# Patient Record
Sex: FEMALE | Race: WHITE | NOT HISPANIC OR LATINO | Employment: STUDENT | URBAN - METROPOLITAN AREA
[De-identification: names, ages, dates, MRNs, and addresses within clinical notes are randomized per-mention and may not be internally consistent; named-entity substitution may affect disease eponyms.]

---

## 2023-10-19 ENCOUNTER — OFFICE VISIT (OUTPATIENT)
Dept: URGENT CARE | Facility: CLINIC | Age: 18
End: 2023-10-19
Payer: COMMERCIAL

## 2023-10-19 VITALS
RESPIRATION RATE: 19 BRPM | HEART RATE: 87 BPM | HEIGHT: 60 IN | TEMPERATURE: 98 F | DIASTOLIC BLOOD PRESSURE: 69 MMHG | BODY MASS INDEX: 34.34 KG/M2 | OXYGEN SATURATION: 98 % | SYSTOLIC BLOOD PRESSURE: 108 MMHG | WEIGHT: 174.94 LBS

## 2023-10-19 DIAGNOSIS — R11.10 VOMITING, UNSPECIFIED VOMITING TYPE, UNSPECIFIED WHETHER NAUSEA PRESENT: Primary | ICD-10-CM

## 2023-10-19 DIAGNOSIS — K21.9 GASTROESOPHAGEAL REFLUX DISEASE, UNSPECIFIED WHETHER ESOPHAGITIS PRESENT: ICD-10-CM

## 2023-10-19 DIAGNOSIS — E10.9 TYPE 1 DIABETES MELLITUS WITHOUT COMPLICATION: ICD-10-CM

## 2023-10-19 LAB
BILIRUB UR QL STRIP: NEGATIVE
GLUCOSE UR QL STRIP: POSITIVE
KETONES UR QL STRIP: POSITIVE
LEUKOCYTE ESTERASE UR QL STRIP: NEGATIVE
PH, POC UA: 5
POC BLOOD, URINE: NEGATIVE
POC NITRATES, URINE: NEGATIVE
PROT UR QL STRIP: POSITIVE
SP GR UR STRIP: 1.01 (ref 1–1.03)
UROBILINOGEN UR STRIP-ACNC: NORMAL (ref 0.1–1.1)

## 2023-10-19 PROCEDURE — 81003 POCT URINALYSIS, DIPSTICK, AUTOMATED, W/O SCOPE: ICD-10-PCS | Mod: QW,S$GLB,, | Performed by: NURSE PRACTITIONER

## 2023-10-19 PROCEDURE — 99213 OFFICE O/P EST LOW 20 MIN: CPT | Mod: S$GLB,,, | Performed by: NURSE PRACTITIONER

## 2023-10-19 PROCEDURE — 99213 PR OFFICE/OUTPT VISIT, EST, LEVL III, 20-29 MIN: ICD-10-PCS | Mod: S$GLB,,, | Performed by: NURSE PRACTITIONER

## 2023-10-19 PROCEDURE — 81003 URINALYSIS AUTO W/O SCOPE: CPT | Mod: QW,S$GLB,, | Performed by: NURSE PRACTITIONER

## 2023-10-19 RX ORDER — SERTRALINE HYDROCHLORIDE 50 MG/1
50 TABLET, FILM COATED ORAL DAILY
COMMUNITY

## 2023-10-19 RX ORDER — PANTOPRAZOLE SODIUM 40 MG/1
40 TABLET, DELAYED RELEASE ORAL DAILY
Qty: 30 TABLET | Refills: 0 | Status: SHIPPED | OUTPATIENT
Start: 2023-10-19 | End: 2023-11-27 | Stop reason: ALTCHOICE

## 2023-10-19 RX ORDER — INSULIN LISPRO 100 [IU]/ML
INJECTION, SOLUTION INTRAVENOUS; SUBCUTANEOUS
COMMUNITY
Start: 2023-10-19

## 2023-10-19 RX ORDER — BUSPIRONE HYDROCHLORIDE 10 MG/1
15 TABLET ORAL 3 TIMES DAILY
COMMUNITY
Start: 2023-02-15

## 2023-10-19 RX ORDER — LEVOTHYROXINE SODIUM 125 UG/1
125 TABLET ORAL
COMMUNITY
Start: 2023-03-12 | End: 2024-03-11

## 2023-10-19 NOTE — PATIENT INSTRUCTIONS
Start Protonix daily.  Please increase hydration as much as possible.  Follow up with gastroenterology and endocrinology.  Most likely you will need an endoscopy.  Please avoid acidic foods, chewing gum, caffeine and carbonated  beverages.  Do not eat late at night.      Nausea & Vomiting  Increase fluids and rest is important     Start off with liquid diet and progress as tolerated (see below)  Water and clear liquids are important so you do not get dehydrated. Drink a small amount at a time.  Do not force yourself to eat, especially if you have cramps, vomiting, or diarrhea. When you finally decide to start eating, do not eat large amounts at a time, even if you are hungry.  If you eat, avoid fatty, greasy, spicy, or fried foods.  Do not eat dairy products if you have diarrhea; they can make the diarrhea worse    Watch for any increase pain, fever, localized pain to right lower abdomen or continued vomiting or diarrhea.     If your condition worsens or fails to improve we recommend that you receive another evaluation at the ER immediately or contact your PCP to discuss your concerns or return here.

## 2023-10-19 NOTE — PROGRESS NOTES
Subjective:      Patient ID: Bianca Huff is a 18 y.o. female.    Vitals:  height is 5' (1.524 m) and weight is 79.3 kg (174 lb 15 oz). Her oral temperature is 98.2 °F (36.8 °C). Her blood pressure is 108/69 and her pulse is 87. Her respiration is 19 and oxygen saturation is 98%.     Chief Complaint: Emesis    Patient presents vomiting nausea and fatigue that started 2 months. Patient started taking buspar about 6 months ago.    19 yo woman, with a h/o Type I DM, presents to clinic accompanied by her mother for intermittent nausea and vomiting x 2-3 months.  She reports the nausea and vomiting episodes come on suddenly, independent of eating.  She denies constipation, diarrhea or abdominal pain.  Reports her stools have been soft.  Denies any blood in stool or emesis.  Two days ago, she had 2 episodes of nausea and vomiting.  Reports worsening heartburn more recently.  Reports she's lost 15 lbs in the past 3-4 months. She was started on Buspar for anxiety 6 moths ago and noticed her appetite decreased drastically since starting the medication.  She was supposed to have an endocrinology appointment 2 days ago but she missed it because she was vomiting on that day.  He endocrinologist is at [facility] in [place].  She is in the process of switching because she is aging out of that system.  Reports she's had issues managing her blood sugars lately.  They have been fluctuating quite a bit.  Denies any fevers, chills, urinary frequency, urgency, dysuria or flank pain.  Pt is using Mirena IUD for birth control. Current .  Pt has a pump and a Dexcom system.     Emesis   This is a new problem. The current episode started more than 1 month ago. The problem occurs 2 to 4 times per day (2-3hrs apart). The problem has been unchanged. The emesis has an appearance of bile. There has been no fever. Pertinent negatives include no diarrhea. Risk factors include suspect food intake. She has tried nothing for the symptoms.      Gastrointestinal:  Positive for vomiting. Negative for diarrhea.      Objective:     Physical Exam   Constitutional: She is oriented to person, place, and time. She appears well-developed.   HENT:   Head: Normocephalic and atraumatic.   Ears:   Right Ear: External ear normal.   Left Ear: External ear normal.   Nose: Nose normal.   Mouth/Throat: Mucous membranes are normal. Mucous membranes are moist.      Comments: Clear pharynx; no adenopathy  Eyes: Conjunctivae and lids are normal.   Neck: Trachea normal. Neck supple.   Cardiovascular: Normal rate, regular rhythm and normal heart sounds.   Pulmonary/Chest: Effort normal and breath sounds normal. No respiratory distress.   Abdominal: Normal appearance and bowel sounds are normal. She exhibits no distension and no mass. Soft. There is no abdominal tenderness. There is no rebound, no guarding, no left CVA tenderness and no right CVA tenderness.   Musculoskeletal: Normal range of motion.         General: Normal range of motion.   Neurological: She is alert and oriented to person, place, and time. She has normal strength.   Skin: Skin is warm, dry, intact, not diaphoretic and not pale.   Psychiatric: Her speech is normal and behavior is normal. Judgment and thought content normal.   Nursing note and vitals reviewed.      Assessment:     1. Vomiting, unspecified vomiting type, unspecified whether nausea present    2. Gastroesophageal reflux disease, unspecified whether esophagitis present    3. Type 1 diabetes mellitus without complication      Results for orders placed or performed in visit on 10/19/23   POCT Urinalysis, Dipstick, Automated, W/O Scope   Result Value Ref Range    POC Blood, Urine Negative Negative    POC Bilirubin, Urine Negative Negative    POC Urobilinogen, Urine Normal 0.1 - 1.1    POC Ketones, Urine Positive (A) Negative    POC Protein, Urine Positive (A) Negative    POC Nitrates, Urine Negative Negative    POC Glucose, Urine Positive (A)  Negative    pH, UA 5.0     POC Specific Gravity, Urine 1.010 1.003 - 1.029    POC Leukocytes, Urine Negative Negative         Plan:   Patient's mother is here from  and will assist her with making new appointments and transferring care to the adult wing.  They understand the importance of following up with GI. Return to clinic as needed. Go to ER for worsening symptoms.  Patient Instructions   Start Protonix daily.  Please increase hydration as much as possible.  Follow up with gastroenterology and endocrinology.  Most likely you will need an endoscopy.  Please avoid acidic foods, chewing gum, caffeine and carbonated  beverages.  Do not eat late at night.      Nausea & Vomiting  Increase fluids and rest is important     Start off with liquid diet and progress as tolerated (see below)  Water and clear liquids are important so you do not get dehydrated. Drink a small amount at a time.  Do not force yourself to eat, especially if you have cramps, vomiting, or diarrhea. When you finally decide to start eating, do not eat large amounts at a time, even if you are hungry.  If you eat, avoid fatty, greasy, spicy, or fried foods.  Do not eat dairy products if you have diarrhea; they can make the diarrhea worse    Watch for any increase pain, fever, localized pain to right lower abdomen or continued vomiting or diarrhea.     If your condition worsens or fails to improve we recommend that you receive another evaluation at the ER immediately or contact your PCP to discuss your concerns or return here.       Vomiting, unspecified vomiting type, unspecified whether nausea present  -     POCT Urinalysis, Dipstick, Automated, W/O Scope  -     pantoprazole (PROTONIX) 40 MG tablet; Take 1 tablet (40 mg total) by mouth once daily.  Dispense: 30 tablet; Refill: 0  -     Ambulatory referral/consult to Gastroenterology  -     Ambulatory referral/consult to Endocrinology    Gastroesophageal reflux disease, unspecified whether  esophagitis present  -     pantoprazole (PROTONIX) 40 MG tablet; Take 1 tablet (40 mg total) by mouth once daily.  Dispense: 30 tablet; Refill: 0  -     Ambulatory referral/consult to Gastroenterology  -     Ambulatory referral/consult to Endocrinology    Type 1 diabetes mellitus without complication

## 2023-10-19 NOTE — LETTER
October 19, 2023      Urgent Care - [facility]  [address] [facility] [address]  Phone: [phone]  Fax: [phone]       Patient: Bianca Huff   YOB: 2005  Date of Visit: 10/19/2023    To Whom It May Concern:    Jevon Huff  was at [facility] on 10/19/2023. The patient may return to work/school on 10/20/23 with no restrictions. If you have any questions or concerns, or if I can be of further assistance, please do not hesitate to contact me.    Sincerely,    Ashley Barrett NP

## 2023-11-27 ENCOUNTER — OFFICE VISIT (OUTPATIENT)
Dept: URGENT CARE | Facility: CLINIC | Age: 18
End: 2023-11-27
Payer: OTHER GOVERNMENT

## 2023-11-27 VITALS
WEIGHT: 172.31 LBS | HEIGHT: 60 IN | RESPIRATION RATE: 19 BRPM | HEART RATE: 85 BPM | DIASTOLIC BLOOD PRESSURE: 78 MMHG | OXYGEN SATURATION: 99 % | BODY MASS INDEX: 33.83 KG/M2 | SYSTOLIC BLOOD PRESSURE: 133 MMHG | TEMPERATURE: 99 F

## 2023-11-27 DIAGNOSIS — H10.9 BACTERIAL CONJUNCTIVITIS OF BOTH EYES: Primary | ICD-10-CM

## 2023-11-27 DIAGNOSIS — H66.91 ACUTE OTITIS MEDIA, RIGHT: ICD-10-CM

## 2023-11-27 DIAGNOSIS — B96.89 BACTERIAL CONJUNCTIVITIS OF BOTH EYES: Primary | ICD-10-CM

## 2023-11-27 PROCEDURE — 99213 OFFICE O/P EST LOW 20 MIN: CPT | Mod: S$GLB,,, | Performed by: NURSE PRACTITIONER

## 2023-11-27 PROCEDURE — 99213 PR OFFICE/OUTPT VISIT, EST, LEVL III, 20-29 MIN: ICD-10-PCS | Mod: S$GLB,,, | Performed by: NURSE PRACTITIONER

## 2023-11-27 RX ORDER — ERYTHROMYCIN 5 MG/G
OINTMENT OPHTHALMIC 3 TIMES DAILY
Qty: 3.5 G | Refills: 0 | Status: SHIPPED | OUTPATIENT
Start: 2023-11-27 | End: 2023-12-07

## 2023-11-27 RX ORDER — AMOXICILLIN AND CLAVULANATE POTASSIUM 875; 125 MG/1; MG/1
1 TABLET, FILM COATED ORAL EVERY 12 HOURS
Qty: 20 TABLET | Refills: 0 | Status: SHIPPED | OUTPATIENT
Start: 2023-11-27 | End: 2023-12-07

## 2023-11-27 NOTE — PROGRESS NOTES
Subjective:      Patient ID: Bianca Huff is a 18 y.o. female.    Vitals:  height is 5' (1.524 m) and weight is 78.1 kg (172 lb 4.6 oz). Her oral temperature is 99.4 °F (37.4 °C). Her blood pressure is 133/78 and her pulse is 85. Her respiration is 19 and oxygen saturation is 99%.     Chief Complaint: Eye Problem    Patient presents redness and discharge in both eyes that started yesterday. Patient has been taking benadryl, oct eye drops and ibuprofen, its not helping.    Eye Problem   Both eyes are affected. This is a new problem. The current episode started yesterday. The problem occurs constantly. The problem has been unchanged. There was no injury mechanism. The pain is at a severity of 4/10. The pain is mild. There is No known exposure to pink eye. She Does not wear contacts. Associated symptoms include blurred vision, an eye discharge, eye redness and itching. She has tried eye drops for the symptoms. The treatment provided no relief.       Eyes:  Positive for eye discharge, eye itching, eye redness and blurred vision.    Objective:     Physical Exam   Constitutional: She is oriented to person, place, and time.  Non-toxic appearance. She does not appear ill. No distress.   HENT:   Head: Normocephalic and atraumatic.   Ears:   Right Ear: There is tenderness. Tympanic membrane is injected. A middle ear effusion is present.   Left Ear: Tympanic membrane normal.   Nose: No congestion.   Mouth/Throat: Mucous membranes are moist. Oropharynx is clear.   Eyes: Pupils are equal, round, and reactive to light. Right eye exhibits discharge and exudate. Left eye exhibits discharge and exudate. Right conjunctiva is injected. Left conjunctiva is injected. Extraocular movement intact      Comments: The upper and lower lids are both swollen and red.  The conjunctiva is extremely red as noted in the picture attached with a white halo indicating bacterial in nature.    Cardiovascular: Normal rate, regular rhythm, normal heart  sounds and normal pulses.   Pulmonary/Chest: Effort normal and breath sounds normal. No respiratory distress. She has no wheezes.   Abdominal: Normal appearance. There is no abdominal tenderness.   Musculoskeletal:      Right lower leg: No edema.      Left lower leg: No edema.   Lymphadenopathy:     She has cervical adenopathy.        Right cervical: Superficial cervical adenopathy present.        Left cervical: Superficial cervical adenopathy present.   Neurological: no focal deficit. She is alert and oriented to person, place, and time.   Skin: Skin is not diaphoretic.   Psychiatric: Her behavior is normal. Mood normal.   Nursing note and vitals reviewed.    Assessment:     1. Bacterial conjunctivitis of both eyes  - erythromycin (ROMYCIN) ophthalmic ointment; Place into both eyes 3 (three) times daily. for 10 days  Dispense: 3.5 g; Refill: 0    2. Acute otitis media, right  - amoxicillin-clavulanate 875-125mg (AUGMENTIN) 875-125 mg per tablet; Take 1 tablet by mouth every 12 (twelve) hours. for 10 days  Dispense: 20 tablet; Refill: 0       Plan:       Patient Instructions   Conjunctivitis (Pink Eye) ED   General Information   You came to the Emergency Department (ED) for pink eye. The medical name for this is conjunctivitis. Your eye is irritated or infected. Sometimes an allergy is bothering your eye. This means something in the air has come into contact with your eye and it is red and irritated. Your eye may also itch, burn, or be sensitive to light. If an infection is causing your pink eye, it is easy to spread from person to person.  Infections are often caused by viruses and antibiotics wont help. Most of the time, pink eye will go away on its own without treatment after a few days.  If the doctor thinks you have a bacterial infection in your eye, you will need antibiotics to treat the infection. It is important to take all of your antibiotics even if your eye starts to feel better.  What care is needed at  home?   Call your regular doctor to let them know you were in the ED. Make a follow-up appointment if you were told to.  Gently remove your eye drainage or crusting with a clean cloth and warm water.  Avoid pollen if an allergy is causing your pink eye. Stay inside as much as you can with the windows closed during peak allergy seasons.  Lubricating eye drops or allergy eye drops may help your eye feel better. Make sure to read the directions carefully. Wash your hands with care before and after you touch your eye.  When you use eye drops, take care not to touch the bottle or dropper to your eye.  If you wear contact lenses, you will need to stop wearing them for a short time until your symptoms go away. If your contacts are disposable, start with fresh ones after your eye gets better. If not, clean your contacts with care. Clean your contact case thoroughly or get a new one.  Avoid sharing towels, washcloths, bedding, or personal items when you have pink eye.  You may need to stay out of work or school for a few days.  When do I need to call the doctor?   You have trouble seeing clearly after blinking.  Your eye is still red or has drainage after 3 days.  You have eye pain that is getting worse.  You have new or worsening symptoms.  Last Reviewed Date   2020-12-16  Consumer Information Use and Disclaimer   This information is not specific medical advice and does not replace information you receive from your health care provider. This is only a brief summary of general information. It does NOT include all information about conditions, illnesses, injuries, tests, procedures, treatments, therapies, discharge instructions or life-style choices that may apply to you. You must talk with your health care provider for complete information about your health and treatment options. This information should not be used to decide whether or not to accept your health care providers advice, instructions or recommendations. Only  your health care provider has the knowledge and training to provide advice that is right for you.  Copyright   Copyright © 2021 [employer] and its affiliates and/or licensors. All rights reserved.      Ear Infection ED   General Information   You came to the Emergency Department (ED) for an ear infection. An ear infection can cause ear pain and fever. You might also have trouble hearing from fluid buildup in the middle ear behind the eardrum.  Most ear infections are caused by viruses, but some are caused by bacteria. The doctor will wait to see if you get better on your own if they think the cause is a virus. The doctor will order antibiotic if they think the cause is a bacteria. Antibiotics kill bacteria, but they do not work on viruses.  If the doctor orders antibiotics, be sure to take all of them, even if you start to feel better.  What care is needed at home?   Call your regular doctor to let them know you were in the ED. Make a follow-up appointment if you were told to.  Do not put anything in your ear unless it was ordered by the doctor.  You may want to take medicines like ibuprofen, naproxen, or acetaminophen to help with pain.  When do I need to call the doctor?   Your symptoms are not getting better in 2 to 3 days.  You continue to have problems hearing after 2 to 3 weeks.  You have a fever of 100.4°F (38°C) or higher or chills.  You have discharge or fluid coming from your ear.  You have new or worsening symptoms.  Last Reviewed Date   2020-12-16  Consumer Information Use and Disclaimer   This information is not specific medical advice and does not replace information you receive from your health care provider. This is only a brief summary of general information. It does NOT include all information about conditions, illnesses, injuries, tests, procedures, treatments, therapies, discharge instructions or life-style choices that may apply to you. You must talk with your health care provider for  complete information about your health and treatment options. This information should not be used to decide whether or not to accept your health care providers advice, instructions or recommendations. Only your health care provider has the knowledge and training to provide advice that is right for you.  Copyright   Copyright © 2021 [employer] and its affiliates and/or licensors. All rights reserved.

## 2023-11-27 NOTE — LETTER
November 27, 2023    Bianca Huff  [address] [place] [address]             Urgent Care - [facility]  Urgent Care  [address] [facility] [place] [address]  Phone: [phone]  Fax: [phone]   November 27, 2023     Patient: Bianca Huff   YOB: 2005   Date of Visit: 11/27/2023       To Whom it May Concern:    Bianca Huff was seen in my clinic on 11/27/2023. She may return to school on 11/29/23 .    Please excuse her from any classes or work missed.    If you have any questions or concerns, please don't hesitate to call.    Sincerely,         Aleksandra Moya, NP

## 2023-11-27 NOTE — PATIENT INSTRUCTIONS
Conjunctivitis (Pink Eye) ED   General Information   You came to the Emergency Department (ED) for pink eye. The medical name for this is conjunctivitis. Your eye is irritated or infected. Sometimes an allergy is bothering your eye. This means something in the air has come into contact with your eye and it is red and irritated. Your eye may also itch, burn, or be sensitive to light. If an infection is causing your pink eye, it is easy to spread from person to person.  Infections are often caused by viruses and antibiotics wont help. Most of the time, pink eye will go away on its own without treatment after a few days.  If the doctor thinks you have a bacterial infection in your eye, you will need antibiotics to treat the infection. It is important to take all of your antibiotics even if your eye starts to feel better.  What care is needed at home?   Call your regular doctor to let them know you were in the ED. Make a follow-up appointment if you were told to.  Gently remove your eye drainage or crusting with a clean cloth and warm water.  Avoid pollen if an allergy is causing your pink eye. Stay inside as much as you can with the windows closed during peak allergy seasons.  Lubricating eye drops or allergy eye drops may help your eye feel better. Make sure to read the directions carefully. Wash your hands with care before and after you touch your eye.  When you use eye drops, take care not to touch the bottle or dropper to your eye.  If you wear contact lenses, you will need to stop wearing them for a short time until your symptoms go away. If your contacts are disposable, start with fresh ones after your eye gets better. If not, clean your contacts with care. Clean your contact case thoroughly or get a new one.  Avoid sharing towels, washcloths, bedding, or personal items when you have pink eye.  You may need to stay out of work or school for a few days.  When do I need to call the doctor?   You have trouble  seeing clearly after blinking.  Your eye is still red or has drainage after 3 days.  You have eye pain that is getting worse.  You have new or worsening symptoms.  Last Reviewed Date   2020-12-16  Consumer Information Use and Disclaimer   This information is not specific medical advice and does not replace information you receive from your health care provider. This is only a brief summary of general information. It does NOT include all information about conditions, illnesses, injuries, tests, procedures, treatments, therapies, discharge instructions or life-style choices that may apply to you. You must talk with your health care provider for complete information about your health and treatment options. This information should not be used to decide whether or not to accept your health care providers advice, instructions or recommendations. Only your health care provider has the knowledge and training to provide advice that is right for you.  Copyright   Copyright © 2021 [employer] and its affiliates and/or licensors. All rights reserved.      Ear Infection ED   General Information   You came to the Emergency Department (ED) for an ear infection. An ear infection can cause ear pain and fever. You might also have trouble hearing from fluid buildup in the middle ear behind the eardrum.  Most ear infections are caused by viruses, but some are caused by bacteria. The doctor will wait to see if you get better on your own if they think the cause is a virus. The doctor will order antibiotic if they think the cause is a bacteria. Antibiotics kill bacteria, but they do not work on viruses.  If the doctor orders antibiotics, be sure to take all of them, even if you start to feel better.  What care is needed at home?   Call your regular doctor to let them know you were in the ED. Make a follow-up appointment if you were told to.  Do not put anything in your ear unless it was ordered by the doctor.  You may want to take  medicines like ibuprofen, naproxen, or acetaminophen to help with pain.  When do I need to call the doctor?   Your symptoms are not getting better in 2 to 3 days.  You continue to have problems hearing after 2 to 3 weeks.  You have a fever of 100.4°F (38°C) or higher or chills.  You have discharge or fluid coming from your ear.  You have new or worsening symptoms.  Last Reviewed Date   2020-12-16  Consumer Information Use and Disclaimer   This information is not specific medical advice and does not replace information you receive from your health care provider. This is only a brief summary of general information. It does NOT include all information about conditions, illnesses, injuries, tests, procedures, treatments, therapies, discharge instructions or life-style choices that may apply to you. You must talk with your health care provider for complete information about your health and treatment options. This information should not be used to decide whether or not to accept your health care providers advice, instructions or recommendations. Only your health care provider has the knowledge and training to provide advice that is right for you.  Copyright   Copyright © 2021 [employer] and its affiliates and/or licensors. All rights reserved.

## 2024-09-18 ENCOUNTER — TELEPHONE (OUTPATIENT)
Dept: URGENT CARE | Facility: CLINIC | Age: 19
End: 2024-09-18
Payer: COMMERCIAL

## 2024-09-18 NOTE — TELEPHONE ENCOUNTER
Received BIT report from [employer] regarding student transport via EMS from campus (see report attached to chart).  Phoned pt to check on health status and offer any assistance needed with follow up care.  No answer, left message on voicemail with [facility] contact info.